# Patient Record
Sex: MALE | Race: WHITE | URBAN - METROPOLITAN AREA
[De-identification: names, ages, dates, MRNs, and addresses within clinical notes are randomized per-mention and may not be internally consistent; named-entity substitution may affect disease eponyms.]

---

## 2018-12-01 ENCOUNTER — EMERGENCY (EMERGENCY)
Facility: HOSPITAL | Age: 28
LOS: 1 days | Discharge: HOME | End: 2018-12-01
Attending: EMERGENCY MEDICINE | Admitting: EMERGENCY MEDICINE
Payer: MEDICAID

## 2018-12-01 VITALS
TEMPERATURE: 99 F | OXYGEN SATURATION: 100 % | RESPIRATION RATE: 16 BRPM | HEART RATE: 94 BPM | SYSTOLIC BLOOD PRESSURE: 111 MMHG | DIASTOLIC BLOOD PRESSURE: 61 MMHG

## 2018-12-01 VITALS
OXYGEN SATURATION: 99 % | TEMPERATURE: 98 F | DIASTOLIC BLOOD PRESSURE: 73 MMHG | HEART RATE: 89 BPM | RESPIRATION RATE: 18 BRPM | SYSTOLIC BLOOD PRESSURE: 110 MMHG

## 2018-12-01 PROCEDURE — 99283 EMERGENCY DEPT VISIT LOW MDM: CPT | Mod: 25

## 2018-12-01 RX ORDER — ONDANSETRON 8 MG/1
4 TABLET, FILM COATED ORAL ONCE
Qty: 0 | Refills: 0 | Status: COMPLETED | OUTPATIENT
Start: 2018-12-01 | End: 2018-12-01

## 2018-12-01 RX ORDER — ONDANSETRON 8 MG/1
1 TABLET, FILM COATED ORAL
Qty: 12 | Refills: 0 | OUTPATIENT
Start: 2018-12-01 | End: 2018-12-04

## 2018-12-01 RX ADMIN — ONDANSETRON 4 MILLIGRAM(S): 8 TABLET, FILM COATED ORAL at 07:05

## 2018-12-01 NOTE — ED ADULT TRIAGE NOTE - CHIEF COMPLAINT QUOTE
pt. w/ mo pmh c/o N/V/D since yesterday afternoon around 4:30pm. Pt. states since then he has been feeling weird , having upper quadrant abdominal discomfort. Pt. c/o throat pain secondary to 3x episodes of emesis. Pt. appears in NAD in triage.

## 2018-12-01 NOTE — ED PROVIDER NOTE - PHYSICAL EXAMINATION
Gen: AAOx3, non-toxic  Head: NCAT  HEENT: EOMI, oral mucosa moist, normal conjunctiva  Lung: CTAB, no respiratory distress, no wheezes/rhonchi/rales B/L, speaking in full sentences  CV: RRR, no murmurs, rubs or gallops  Abd: soft, NTND, no guarding  MSK: no visible deformities  Neuro: No focal sensory or motor deficits  Skin: Warm, well perfused, no rash  Psych: normal affect.   ~David Desouza M.D. Resident

## 2018-12-01 NOTE — ED PROVIDER NOTE - NS ED ROS FT
GENERAL: No fever or chills, EYES: no change in vision, HEENT: no trouble swallowing or speaking, CARDIAC: no chest pain, PULMONARY: no cough or SOB, GI: no abdominal pain, N/V/D, : No changes in urination, SKIN: no rashes, NEURO: no headache,  MSK: No joint pain ~David Desouza M.D. Resident

## 2018-12-01 NOTE — ED PROVIDER NOTE - NSFOLLOWUPINSTRUCTIONS_ED_ALL_ED_FT
Please seek care if you have new chest pain, shortness of breath, fevers, vomiting or worsening of symptoms that brought you to the emergency room today.  Please follow up with your primary care physician in 1-2 days or as soon as possible.    Take ondansetron as prescribed, sent to your pharmacy

## 2018-12-01 NOTE — ED PROVIDER NOTE - OBJECTIVE STATEMENT
27 yo M no PMHx p/w N/V/D. Pt left work yesterday and went to [employer] and got some chips and went home. He started having N/V and loose stools in the evening which progressed to liquid diarrhea and continuous N/V overnight. He came to the ER for further evaluation. Uday abd pain, fevers, +chills, denies dysuria, URI sx. Denies tobacco, ETOH, drug use.

## 2018-12-01 NOTE — ED ADULT NURSE NOTE - NSIMPLEMENTINTERV_GEN_ALL_ED
Implemented All Universal Safety Interventions:  [place] to call system. Call bell, personal items and telephone within reach. Instruct patient to call for assistance. Room bathroom lighting operational. Non-slip footwear when patient is off stretcher. Physically safe environment: no spills, clutter or unnecessary equipment. Stretcher in lowest position, wheels locked, appropriate side rails in place.

## 2018-12-01 NOTE — ED PROVIDER NOTE - ATTENDING CONTRIBUTION TO CARE
KATHRYN JONES  ATTENDING, MD: 29 yo M no pertinent past medical history presents with N/V/D starting yesterday.  Patient states he ate some chips yesterday after work and then became symptomatic with N/V (NB/NB) and loose to liquid (NB) stools yesterday evening that persisted overnight.  Patient c/o mild chills.  Patient denies Fever, HA, NP, chest pain, SOB, cough, abd pain, dizziness, urinary symptoms, extremity pain or swelling or other complaints.   No one at home with similar symptoms.    PHYSICAL EXAM:  Vital signs reviewed.  GENERAL: Patient is awake and alert and in no acute distress.  Non-toxic appearing.  A+Ox4  HEAD:  Airway patent.  No oropharyngeal edema.  No stridor.  Auricles are normal.    EYES: EOM grossly intact, conjunctiva non-injected and sclera clear  NECK: Supple, No vertebral point tenderness to palpation.  CHEST/LUNG: Lungs clear to auscultation bilaterally; no wheeze, no rhonchi,  no rales.    HEART: Regular rate and rhythm;   ABDOMEN: Soft, non-tender to palpation.  No rebound/no guarding.  Bowel sounds present x 4.   MSK/EXTREMITIES: No clubbing or cyanosis. Back is nontender, with no vertebral point tenderness to palpation, no CVAT.  Moving all 4 extremities.     NEURO: Neurologically grossly intact.   No obvious deficits.   PSYCH: Psychiatrically normal mood and affect.  No apparent risk to self or others.       DR. JONES, ATTENDING MD:    I performed a face to face bedside interview with patient regarding history of present illness, review of symptoms and past medical history. I completed an independent physical exam.  I have discussed patient's plan of care with the team of health care providers.   I agree with note as stated above, having amended the EMR as needed to reflect my findings. I have discussed the assessment and plan of care.  This includes during the time I functioned as the attending physician for this patient.

## 2018-12-01 NOTE — ED PROVIDER NOTE - DIAGNOSIS COUNSELING, MDM
conducted a detailed discussion... I had a detailed discussion with the patient and/or guardian regarding the historical points, exam findings, and the discharge diagnosis.

## 2018-12-01 NOTE — ED PROVIDER NOTE - MEDICAL DECISION MAKING DETAILS
27 yo M no PMHx p/w N/V/D, abd S/NT/ND, well appearing, euvolemic clinically, will give antiemetics and PO challenge 29 yo M no PMHx p/w N/V/D, abd S/NT/ND, well appearing, euvolemic clinically, will give antiemetics and PO challenge.  D/W patient and family slow PO challenge, taking a sip of liquid q 5m.  If patient can not tolerate PO, will place IV line and give IV hydration.  Patient and family agree with POC.

## 2018-12-01 NOTE — ED ADULT NURSE NOTE - OBJECTIVE STATEMENT
Pt presents to rm 17, A&Ox4, ambulatory at baseline w/o assistance, no pertinent pmhx, here for evaluation of N/V/D and abd pain x1day, states all symptoms have subsided at this time. C/O headache but tolerable at this time. Denies chest pain, shortness of breath, palpitations, diaphoresis, fevers, dizziness, nausea, vomiting, diarrhea, or urinary symptoms at this time. Call bell in reach, warm blanket provided, bed in lowest position, side rails up x2.  Will continue to monitor.

## 2022-04-06 NOTE — ED ADULT NURSE NOTE - CAS TRG GEN SKIN CONDITION
Orders received from Dr. Bonner to schedule patient for surgery.  ROBERTA msg left at the pt's cell.     Warm